# Patient Record
Sex: FEMALE | Race: AMERICAN INDIAN OR ALASKA NATIVE
[De-identification: names, ages, dates, MRNs, and addresses within clinical notes are randomized per-mention and may not be internally consistent; named-entity substitution may affect disease eponyms.]

---

## 2022-09-26 ENCOUNTER — HOSPITAL ENCOUNTER (EMERGENCY)
Dept: HOSPITAL 5 - ED | Age: 37
Discharge: HOME | End: 2022-09-26
Payer: MEDICAID

## 2022-09-26 VITALS — SYSTOLIC BLOOD PRESSURE: 177 MMHG | DIASTOLIC BLOOD PRESSURE: 111 MMHG

## 2022-09-26 DIAGNOSIS — Z79.899: ICD-10-CM

## 2022-09-26 DIAGNOSIS — Z72.89: ICD-10-CM

## 2022-09-26 DIAGNOSIS — M25.572: Primary | ICD-10-CM

## 2022-09-26 DIAGNOSIS — G89.29: ICD-10-CM

## 2022-09-26 DIAGNOSIS — R03.0: ICD-10-CM

## 2022-09-26 DIAGNOSIS — I10: ICD-10-CM

## 2022-09-26 PROCEDURE — 99282 EMERGENCY DEPT VISIT SF MDM: CPT

## 2022-09-26 NOTE — EMERGENCY DEPARTMENT REPORT
ED Lower Extremity HPI





- General


Stated Complaint: LEFT ANKLE PAIN


Time Seen by Provider: 09/26/22 10:54


Source: patient


Mode of arrival: Ambulatory


Limitations: No Limitations





- History of Present Illness


Initial Comments: 





Patient is a 36-year-old female that comes to the emergency room with chronic 

left ankle pain.  This is been going on for years.  She has no new fall or tra

alexandre.  She is ambulatory to the ER.  She is taking nothing for pain prior to 

arrival in the ER.  She has not seen her primary care doctor.





Patient's blood pressure noted to be elevated.  She denies chest pain or 

shortness of breath.  She states she is just in pain.





Patient reports that she is taking her vitamins for her previous gastric bypass 

surgery.  She is also on blood pressure medicine.  She states that she takes it 

daily.


-: month(s)


Severity scale (0 -10): 3


Improves With: nothing


Worsens With: weight bearing, movement





- Related Data


                                    Allergies











Allergy/AdvReac Type Severity Reaction Status Date / Time


 


No Known Allergies Allergy   Verified 09/26/22 10:56














ED Review of Systems


ROS: 


Stated complaint: LEFT ANKLE PAIN


Other details as noted in HPI





Comment: All other systems reviewed and negative





ED Past Medical Hx





- Past Medical History


Previous Medical History?: Yes


Hx Hypertension: Yes


Additional medical history: Obese





- Surgical History


Past Surgical History?: Yes


Additional Surgical History: Gastric bypass surgery





- Family History


Family history: no significant





- Social History


Smoking Status: Never Smoker


Substance Use Type: None





ED Physical Exam





- General


Limitations: No Limitations


General appearance: alert, in no apparent distress





- Head


Head exam: Present: atraumatic, normocephalic





- Eye


Eye exam: Present: normal appearance





- ENT


ENT exam: Present: mucous membranes moist





- Neck


Neck exam: Present: normal inspection





- Respiratory


Respiratory exam: Present: normal lung sounds bilaterally.  Absent: respiratory 

distress





- Cardiovascular


Cardiovascular Exam: Present: regular rate, normal rhythm.  Absent: systolic 

murmur, diastolic murmur, rubs, gallop





- GI/Abdominal


GI/Abdominal exam: Present: soft, normal bowel sounds





- Extremities Exam


Extremities exam: Present: normal inspection





- Back Exam


Back exam: Present: normal inspection





- Neurological Exam


Neurological exam: Present: alert, oriented X3





- Psychiatric


Psychiatric exam: Present: normal affect, normal mood





- Skin


Skin exam: Present: warm, dry, intact, normal color.  Absent: rash





ED Course


                                   Vital Signs











  09/26/22





  10:50


 


Temperature 97.5 F L


 


Pulse Rate 80


 


Respiratory 16





Rate 


 


Blood Pressure 177/111





[Left] 


 


O2 Sat by Pulse 100





Oximetry 














ED Lower Extremity MDM





- Medical Decision Making





Patient comes to the emergency room with chronic ankle pain.  She states that 

she has been to several doctors and nobody will help her.  When I suggested 

Motrin she states that she cannot take that because of her gastric bypass 

surgery.  She was suggestive of wanting opiates.  I told her that opiates would 

not be good given her gastric bypass surgery and its known side effect of 

constipation/ileus.  We have discussed PT, OT, functional pain specialist, 

chronic pain specialist.  She was cooperative during my exam but she could tell 

that she was seeking medications for her pain relief.  I discharged her with 

chronic pain management and functional medicine referrals.  However, the patient

 got angry at the nurses due to the wait in receiving her discharge paperwork 

and she left the ER without her referrals.








                             Vital Signs (72 hours)











  09/26/22





  10:50


 


Temperature 97.5 F L


 


Pulse Rate 80


 


Respiratory 16





Rate 


 


Blood Pressure 177/111





[Left] 


 


O2 Sat by Pulse 100





Oximetry 














- Differential Diagnosis


Chronic pain


Critical care attestation.: 


If time is entered above; I have spent that time in minutes in the direct care 

of this critically ill patient, excluding procedure time.








ED Disposition


Clinical Impression: 


 Ankle pain, chronic, Elevated blood pressure reading, Drug-seeking behavior





Disposition: 01 HOME / SELF CARE / HOMELESS


Is pt being admited?: No


Does the pt Need Aspirin: No


Condition: Stable


Instructions:  Authorized Agent-Controlled Analgesia, Joint Pain, Easy-to-Read


Additional Instructions: 


follow up as we discussed 





referrals below





Monitor your blood pressure it was elevated today.





[facility]


[phone]





FUNCTIONAL MED 


[facility] [place]


Tel: [phone]








Referrals: 


VERONIKA ENG MD [Staff Physician] - 3-5 Days


Forms:  Work/School Release Form(ED)


Time of Disposition: 11:02

## 2023-05-08 ENCOUNTER — OFFICE VISIT (OUTPATIENT)
Dept: URBAN - METROPOLITAN AREA CLINIC 118 | Facility: CLINIC | Age: 38
End: 2023-05-08
Payer: MEDICAID

## 2023-05-08 ENCOUNTER — DASHBOARD ENCOUNTERS (OUTPATIENT)
Age: 38
End: 2023-05-08

## 2023-05-08 ENCOUNTER — LAB OUTSIDE AN ENCOUNTER (OUTPATIENT)
Dept: URBAN - METROPOLITAN AREA CLINIC 118 | Facility: CLINIC | Age: 38
End: 2023-05-08

## 2023-05-08 VITALS
HEART RATE: 85 BPM | HEIGHT: 61 IN | BODY MASS INDEX: 42.48 KG/M2 | DIASTOLIC BLOOD PRESSURE: 107 MMHG | TEMPERATURE: 97.8 F | WEIGHT: 225 LBS | SYSTOLIC BLOOD PRESSURE: 184 MMHG

## 2023-05-08 DIAGNOSIS — Z90.3 H/O GASTRIC SLEEVE: ICD-10-CM

## 2023-05-08 DIAGNOSIS — K21.9 CHRONIC GERD: ICD-10-CM

## 2023-05-08 DIAGNOSIS — R10.13 DYSPEPSIA: ICD-10-CM

## 2023-05-08 PROBLEM — 329281000119107: Status: ACTIVE | Noted: 2023-05-08

## 2023-05-08 PROBLEM — 235595009: Status: ACTIVE | Noted: 2023-05-08

## 2023-05-08 PROCEDURE — 99204 OFFICE O/P NEW MOD 45 MIN: CPT | Performed by: INTERNAL MEDICINE

## 2023-05-08 RX ORDER — PANTOPRAZOLE SODIUM 40 MG/1
1 TABLET TABLET, DELAYED RELEASE ORAL ONCE A DAY
Qty: 30 | Refills: 3 | OUTPATIENT
Start: 2023-05-08

## 2023-05-08 NOTE — HPI-TODAY'S VISIT:
The patient presents for evaluation of abd pain/dyspepsia.  Patient had gastric sleeve in 2019.  she has had daily mid abd discomfort and belching/dyspepsia sx's since then.  denies nsaid's.  not on anti-acid.   denies bowel habit changes or gi bleeding.

## 2023-06-01 ENCOUNTER — OFFICE VISIT (OUTPATIENT)
Dept: URBAN - METROPOLITAN AREA SURGERY CENTER 23 | Facility: SURGERY CENTER | Age: 38
End: 2023-06-01
Payer: MEDICAID

## 2023-06-01 DIAGNOSIS — R12 BURNING REFLUX: ICD-10-CM

## 2023-06-01 DIAGNOSIS — K31.89 ACQUIRED DEFORMITY OF DUODENUM: ICD-10-CM

## 2023-06-01 PROCEDURE — G8907 PT DOC NO EVENTS ON DISCHARG: HCPCS | Performed by: INTERNAL MEDICINE

## 2023-06-01 PROCEDURE — 43239 EGD BIOPSY SINGLE/MULTIPLE: CPT | Performed by: INTERNAL MEDICINE

## 2023-06-01 RX ORDER — PANTOPRAZOLE SODIUM 40 MG/1
1 TABLET TABLET, DELAYED RELEASE ORAL ONCE A DAY
Qty: 30 | Refills: 3 | Status: ACTIVE | COMMUNITY
Start: 2023-05-08